# Patient Record
Sex: MALE | Race: WHITE | NOT HISPANIC OR LATINO | Employment: FULL TIME | URBAN - METROPOLITAN AREA
[De-identification: names, ages, dates, MRNs, and addresses within clinical notes are randomized per-mention and may not be internally consistent; named-entity substitution may affect disease eponyms.]

---

## 2017-09-05 DIAGNOSIS — Z47.89 ENCOUNTER FOR OTHER ORTHOPEDIC AFTERCARE (CODE): ICD-10-CM

## 2017-09-05 DIAGNOSIS — M25.569 PAIN IN KNEE: ICD-10-CM

## 2017-09-06 ENCOUNTER — TRANSCRIBE ORDERS (OUTPATIENT)
Dept: ADMINISTRATIVE | Facility: HOSPITAL | Age: 37
End: 2017-09-06

## 2017-09-06 ENCOUNTER — APPOINTMENT (OUTPATIENT)
Dept: RADIOLOGY | Facility: CLINIC | Age: 37
End: 2017-09-06
Payer: COMMERCIAL

## 2017-09-06 ENCOUNTER — ALLSCRIPTS OFFICE VISIT (OUTPATIENT)
Dept: OTHER | Facility: OTHER | Age: 37
End: 2017-09-06

## 2017-09-06 DIAGNOSIS — M25.561 RIGHT KNEE PAIN, UNSPECIFIED CHRONICITY: Primary | ICD-10-CM

## 2017-09-06 DIAGNOSIS — M25.569 PAIN IN KNEE: ICD-10-CM

## 2017-09-06 PROCEDURE — 73564 X-RAY EXAM KNEE 4 OR MORE: CPT

## 2017-09-08 ENCOUNTER — HOSPITAL ENCOUNTER (OUTPATIENT)
Dept: MRI IMAGING | Facility: HOSPITAL | Age: 37
Discharge: HOME | End: 2017-09-08
Attending: ORTHOPAEDIC SURGERY
Payer: COMMERCIAL

## 2017-09-08 ENCOUNTER — HOSPITAL ENCOUNTER (OUTPATIENT)
Dept: RADIOLOGY | Facility: HOSPITAL | Age: 37
Discharge: HOME | End: 2017-09-08
Attending: ORTHOPAEDIC SURGERY
Payer: COMMERCIAL

## 2017-09-08 DIAGNOSIS — M25.561 RIGHT KNEE PAIN, UNSPECIFIED CHRONICITY: ICD-10-CM

## 2017-09-08 PROCEDURE — 77002 NEEDLE LOCALIZATION BY XRAY: CPT

## 2017-09-08 PROCEDURE — A9585 GADOBUTROL INJECTION: HCPCS | Performed by: ORTHOPAEDIC SURGERY

## 2017-09-08 PROCEDURE — 73722 MRI JOINT OF LWR EXTR W/DYE: CPT

## 2017-09-08 PROCEDURE — 27370 HB INJECTION FOR KNEE X-RAY: CPT

## 2017-09-08 RX ADMIN — GADOBUTROL 0.2 ML: 604.72 INJECTION INTRAVENOUS at 09:26

## 2017-09-08 RX ADMIN — IOHEXOL 50 ML: 300 INJECTION, SOLUTION INTRAVENOUS at 09:18

## 2017-09-12 ENCOUNTER — GENERIC CONVERSION - ENCOUNTER (OUTPATIENT)
Dept: OTHER | Facility: OTHER | Age: 37
End: 2017-09-12

## 2017-09-12 ENCOUNTER — ALLSCRIPTS OFFICE VISIT (OUTPATIENT)
Dept: OTHER | Facility: OTHER | Age: 37
End: 2017-09-12

## 2017-09-22 ENCOUNTER — ANESTHESIA (OUTPATIENT)
Dept: PERIOP | Facility: HOSPITAL | Age: 37
End: 2017-09-22
Payer: COMMERCIAL

## 2017-09-22 ENCOUNTER — HOSPITAL ENCOUNTER (OUTPATIENT)
Facility: HOSPITAL | Age: 37
Setting detail: OUTPATIENT SURGERY
Discharge: HOME | End: 2017-09-22
Attending: ORTHOPAEDIC SURGERY | Admitting: ORTHOPAEDIC SURGERY
Payer: COMMERCIAL

## 2017-09-22 ENCOUNTER — ANESTHESIA EVENT (OUTPATIENT)
Dept: PERIOP | Facility: HOSPITAL | Age: 37
End: 2017-09-22
Payer: COMMERCIAL

## 2017-09-22 VITALS
WEIGHT: 240 LBS | TEMPERATURE: 97.7 F | BODY MASS INDEX: 34.36 KG/M2 | HEIGHT: 70 IN | HEART RATE: 66 BPM | SYSTOLIC BLOOD PRESSURE: 111 MMHG | OXYGEN SATURATION: 98 % | RESPIRATION RATE: 16 BRPM | DIASTOLIC BLOOD PRESSURE: 57 MMHG

## 2017-09-22 PROBLEM — S83.241A TEAR OF MEDIAL MENISCUS OF RIGHT KNEE: Status: ACTIVE | Noted: 2017-09-22

## 2017-09-22 PROBLEM — S83.511A COMPLETE TEAR OF ANTERIOR CRUCIATE LIGAMENT OF RIGHT KNEE: Status: ACTIVE | Noted: 2017-09-22

## 2017-09-22 PROCEDURE — C1776 JOINT DEVICE (IMPLANTABLE): HCPCS | Performed by: ORTHOPAEDIC SURGERY

## 2017-09-22 PROCEDURE — C1769 GUIDE WIRE: HCPCS | Performed by: ORTHOPAEDIC SURGERY

## 2017-09-22 PROCEDURE — C1713 ANCHOR/SCREW BN/BN,TIS/BN: HCPCS | Performed by: ORTHOPAEDIC SURGERY

## 2017-09-22 DEVICE — ENDOBUTTON CL ULTRA 15MM
Type: IMPLANTABLE DEVICE | Site: KNEE | Status: FUNCTIONAL
Brand: ENDOBUTTON

## 2017-09-22 DEVICE — IMPLANTABLE DEVICE: Type: IMPLANTABLE DEVICE | Status: FUNCTIONAL

## 2017-09-22 DEVICE — BIOSURE SYNC 9-10MM TIBIAL FIXATION
Type: IMPLANTABLE DEVICE | Site: KNEE | Status: FUNCTIONAL
Brand: BIOSURE

## 2017-09-22 DEVICE — BIOSURE PK SCREW 9 MM X 25 MM
Type: IMPLANTABLE DEVICE | Site: KNEE | Status: FUNCTIONAL
Brand: BIOSURE

## 2017-09-22 RX ORDER — ACETAMINOPHEN 325 MG/1
650 TABLET ORAL EVERY 6 HOURS PRN
Status: DISCONTINUED | OUTPATIENT
Start: 2017-09-22 | End: 2017-09-22 | Stop reason: HOSPADM

## 2017-09-22 RX ORDER — OXYCODONE HYDROCHLORIDE AND ACETAMINOPHEN 5; 325 MG/1; MG/1
TABLET ORAL
Qty: 50 TABLET | Refills: 0 | Status: SHIPPED | OUTPATIENT
Start: 2017-09-22

## 2017-09-22 RX ORDER — PROPOFOL 10 MG/ML
INJECTION, EMULSION INTRAVENOUS AS NEEDED
Status: DISCONTINUED | OUTPATIENT
Start: 2017-09-22 | End: 2017-09-22 | Stop reason: SURG

## 2017-09-22 RX ORDER — ONDANSETRON 2 MG/ML
INJECTION INTRAMUSCULAR; INTRAVENOUS AS NEEDED
Status: DISCONTINUED | OUTPATIENT
Start: 2017-09-22 | End: 2017-09-22 | Stop reason: SURG

## 2017-09-22 RX ORDER — FENTANYL CITRATE/PF 50 MCG/ML
25 SYRINGE (ML) INJECTION
Status: DISCONTINUED | OUTPATIENT
Start: 2017-09-22 | End: 2017-09-22 | Stop reason: HOSPADM

## 2017-09-22 RX ORDER — FENTANYL CITRATE 50 UG/ML
INJECTION, SOLUTION INTRAMUSCULAR; INTRAVENOUS AS NEEDED
Status: DISCONTINUED | OUTPATIENT
Start: 2017-09-22 | End: 2017-09-22 | Stop reason: SURG

## 2017-09-22 RX ORDER — OXYCODONE HYDROCHLORIDE AND ACETAMINOPHEN 5; 325 MG/1; MG/1
1 TABLET ORAL EVERY 4 HOURS PRN
Status: DISCONTINUED | OUTPATIENT
Start: 2017-09-22 | End: 2017-09-22 | Stop reason: HOSPADM

## 2017-09-22 RX ORDER — SODIUM CHLORIDE, SODIUM LACTATE, POTASSIUM CHLORIDE, CALCIUM CHLORIDE 600; 310; 30; 20 MG/100ML; MG/100ML; MG/100ML; MG/100ML
20 INJECTION, SOLUTION INTRAVENOUS CONTINUOUS
Status: DISCONTINUED | OUTPATIENT
Start: 2017-09-22 | End: 2017-09-22 | Stop reason: HOSPADM

## 2017-09-22 RX ORDER — MORPHINE SULFATE 2 MG/ML
2 INJECTION, SOLUTION INTRAMUSCULAR; INTRAVENOUS EVERY 4 HOURS PRN
Status: DISCONTINUED | OUTPATIENT
Start: 2017-09-22 | End: 2017-09-22 | Stop reason: HOSPADM

## 2017-09-22 RX ORDER — KETOROLAC TROMETHAMINE 30 MG/ML
INJECTION, SOLUTION INTRAMUSCULAR; INTRAVENOUS AS NEEDED
Status: DISCONTINUED | OUTPATIENT
Start: 2017-09-22 | End: 2017-09-22 | Stop reason: SURG

## 2017-09-22 RX ORDER — MIDAZOLAM HYDROCHLORIDE 1 MG/ML
INJECTION INTRAMUSCULAR; INTRAVENOUS AS NEEDED
Status: DISCONTINUED | OUTPATIENT
Start: 2017-09-22 | End: 2017-09-22 | Stop reason: SURG

## 2017-09-22 RX ORDER — ONDANSETRON 2 MG/ML
4 INJECTION INTRAMUSCULAR; INTRAVENOUS ONCE AS NEEDED
Status: DISCONTINUED | OUTPATIENT
Start: 2017-09-22 | End: 2017-09-22 | Stop reason: HOSPADM

## 2017-09-22 RX ORDER — CLINDAMYCIN PHOSPHATE 900 MG/50ML
900 INJECTION INTRAVENOUS ONCE
Status: COMPLETED | OUTPATIENT
Start: 2017-09-22 | End: 2017-09-22

## 2017-09-22 RX ORDER — ONDANSETRON 2 MG/ML
4 INJECTION INTRAMUSCULAR; INTRAVENOUS EVERY 6 HOURS PRN
Status: DISCONTINUED | OUTPATIENT
Start: 2017-09-22 | End: 2017-09-22 | Stop reason: HOSPADM

## 2017-09-22 RX ORDER — OXYCODONE HYDROCHLORIDE AND ACETAMINOPHEN 5; 325 MG/1; MG/1
2 TABLET ORAL EVERY 4 HOURS PRN
Status: DISCONTINUED | OUTPATIENT
Start: 2017-09-22 | End: 2017-09-22 | Stop reason: HOSPADM

## 2017-09-22 RX ORDER — MINERAL OIL
OIL (ML) MISCELLANEOUS AS NEEDED
Status: DISCONTINUED | OUTPATIENT
Start: 2017-09-22 | End: 2017-09-22 | Stop reason: HOSPADM

## 2017-09-22 RX ADMIN — MIDAZOLAM HYDROCHLORIDE 2 MG: 1 INJECTION, SOLUTION INTRAMUSCULAR; INTRAVENOUS at 10:05

## 2017-09-22 RX ADMIN — CLINDAMYCIN PHOSPHATE 900 MG: 18 INJECTION, SOLUTION INTRAMUSCULAR; INTRAVENOUS at 10:00

## 2017-09-22 RX ADMIN — MIDAZOLAM HYDROCHLORIDE 4 MG: 1 INJECTION, SOLUTION INTRAMUSCULAR; INTRAVENOUS at 09:15

## 2017-09-22 RX ADMIN — PROPOFOL 300 MG: 10 INJECTION, EMULSION INTRAVENOUS at 10:05

## 2017-09-22 RX ADMIN — SODIUM CHLORIDE, SODIUM LACTATE, POTASSIUM CHLORIDE, AND CALCIUM CHLORIDE: .6; .31; .03; .02 INJECTION, SOLUTION INTRAVENOUS at 09:08

## 2017-09-22 RX ADMIN — FENTANYL CITRATE 25 MCG: 50 INJECTION, SOLUTION INTRAMUSCULAR; INTRAVENOUS at 12:13

## 2017-09-22 RX ADMIN — ONDANSETRON 4 MG: 2 INJECTION INTRAMUSCULAR; INTRAVENOUS at 11:45

## 2017-09-22 RX ADMIN — SODIUM CHLORIDE, SODIUM LACTATE, POTASSIUM CHLORIDE, AND CALCIUM CHLORIDE 20 ML/HR: .6; .31; .03; .02 INJECTION, SOLUTION INTRAVENOUS at 11:57

## 2017-09-22 RX ADMIN — OXYCODONE HYDROCHLORIDE AND ACETAMINOPHEN 1 TABLET: 5; 325 TABLET ORAL at 13:12

## 2017-09-22 RX ADMIN — SODIUM CHLORIDE, SODIUM LACTATE, POTASSIUM CHLORIDE, AND CALCIUM CHLORIDE 20 ML/HR: .6; .31; .03; .02 INJECTION, SOLUTION INTRAVENOUS at 08:29

## 2017-09-22 RX ADMIN — KETOROLAC TROMETHAMINE 30 MG: 30 INJECTION, SOLUTION INTRAMUSCULAR at 11:43

## 2017-09-22 RX ADMIN — FENTANYL CITRATE 100 MCG: 50 INJECTION, SOLUTION INTRAMUSCULAR; INTRAVENOUS at 09:15

## 2017-10-05 ENCOUNTER — ALLSCRIPTS OFFICE VISIT (OUTPATIENT)
Dept: OTHER | Facility: OTHER | Age: 37
End: 2017-10-05

## 2017-10-05 ENCOUNTER — GENERIC CONVERSION - ENCOUNTER (OUTPATIENT)
Dept: OTHER | Facility: OTHER | Age: 37
End: 2017-10-05

## 2017-10-12 ENCOUNTER — APPOINTMENT (OUTPATIENT)
Dept: PHYSICAL THERAPY | Facility: CLINIC | Age: 37
End: 2017-10-12
Payer: COMMERCIAL

## 2017-10-12 ENCOUNTER — GENERIC CONVERSION - ENCOUNTER (OUTPATIENT)
Dept: OTHER | Facility: OTHER | Age: 37
End: 2017-10-12

## 2017-10-12 DIAGNOSIS — Z47.89 ENCOUNTER FOR OTHER ORTHOPEDIC AFTERCARE (CODE): ICD-10-CM

## 2017-10-12 PROCEDURE — G8991 OTHER PT/OT GOAL STATUS: HCPCS

## 2017-10-12 PROCEDURE — 97161 PT EVAL LOW COMPLEX 20 MIN: CPT

## 2017-10-12 PROCEDURE — G8990 OTHER PT/OT CURRENT STATUS: HCPCS

## 2017-10-12 PROCEDURE — 97110 THERAPEUTIC EXERCISES: CPT

## 2017-10-17 ENCOUNTER — APPOINTMENT (OUTPATIENT)
Dept: PHYSICAL THERAPY | Facility: CLINIC | Age: 37
End: 2017-10-17
Payer: COMMERCIAL

## 2017-10-17 PROCEDURE — 97140 MANUAL THERAPY 1/> REGIONS: CPT

## 2017-10-17 PROCEDURE — 97110 THERAPEUTIC EXERCISES: CPT

## 2017-10-17 PROCEDURE — 97010 HOT OR COLD PACKS THERAPY: CPT

## 2017-10-19 ENCOUNTER — APPOINTMENT (OUTPATIENT)
Dept: PHYSICAL THERAPY | Facility: CLINIC | Age: 37
End: 2017-10-19
Payer: COMMERCIAL

## 2017-10-19 PROCEDURE — 97010 HOT OR COLD PACKS THERAPY: CPT

## 2017-10-19 PROCEDURE — 97110 THERAPEUTIC EXERCISES: CPT

## 2017-10-24 ENCOUNTER — APPOINTMENT (OUTPATIENT)
Dept: PHYSICAL THERAPY | Facility: CLINIC | Age: 37
End: 2017-10-24
Payer: COMMERCIAL

## 2017-10-24 PROCEDURE — 97140 MANUAL THERAPY 1/> REGIONS: CPT

## 2017-10-24 PROCEDURE — 97110 THERAPEUTIC EXERCISES: CPT

## 2017-10-24 PROCEDURE — 97014 ELECTRIC STIMULATION THERAPY: CPT

## 2017-10-24 PROCEDURE — 97010 HOT OR COLD PACKS THERAPY: CPT

## 2017-10-24 PROCEDURE — 97112 NEUROMUSCULAR REEDUCATION: CPT

## 2017-10-26 ENCOUNTER — APPOINTMENT (OUTPATIENT)
Dept: PHYSICAL THERAPY | Facility: CLINIC | Age: 37
End: 2017-10-26
Payer: COMMERCIAL

## 2017-10-26 PROCEDURE — 97112 NEUROMUSCULAR REEDUCATION: CPT

## 2017-10-26 PROCEDURE — 97110 THERAPEUTIC EXERCISES: CPT

## 2017-10-26 PROCEDURE — 97140 MANUAL THERAPY 1/> REGIONS: CPT

## 2017-10-26 PROCEDURE — 97010 HOT OR COLD PACKS THERAPY: CPT

## 2017-10-31 ENCOUNTER — APPOINTMENT (OUTPATIENT)
Dept: PHYSICAL THERAPY | Facility: CLINIC | Age: 37
End: 2017-10-31
Payer: COMMERCIAL

## 2017-10-31 PROCEDURE — 97010 HOT OR COLD PACKS THERAPY: CPT

## 2017-10-31 PROCEDURE — 97110 THERAPEUTIC EXERCISES: CPT

## 2017-10-31 PROCEDURE — 97140 MANUAL THERAPY 1/> REGIONS: CPT

## 2017-11-02 ENCOUNTER — APPOINTMENT (OUTPATIENT)
Dept: PHYSICAL THERAPY | Facility: CLINIC | Age: 37
End: 2017-11-02
Payer: COMMERCIAL

## 2017-11-02 PROCEDURE — 97010 HOT OR COLD PACKS THERAPY: CPT

## 2017-11-02 PROCEDURE — 97140 MANUAL THERAPY 1/> REGIONS: CPT

## 2017-11-02 PROCEDURE — 97110 THERAPEUTIC EXERCISES: CPT

## 2017-11-07 ENCOUNTER — APPOINTMENT (OUTPATIENT)
Dept: PHYSICAL THERAPY | Facility: CLINIC | Age: 37
End: 2017-11-07
Payer: COMMERCIAL

## 2017-11-07 PROCEDURE — 97010 HOT OR COLD PACKS THERAPY: CPT

## 2017-11-07 PROCEDURE — 97140 MANUAL THERAPY 1/> REGIONS: CPT

## 2017-11-07 PROCEDURE — 97110 THERAPEUTIC EXERCISES: CPT

## 2017-11-09 ENCOUNTER — APPOINTMENT (OUTPATIENT)
Dept: PHYSICAL THERAPY | Facility: CLINIC | Age: 37
End: 2017-11-09
Payer: COMMERCIAL

## 2017-11-09 ENCOUNTER — GENERIC CONVERSION - ENCOUNTER (OUTPATIENT)
Dept: OTHER | Facility: OTHER | Age: 37
End: 2017-11-09

## 2017-11-09 PROCEDURE — 97140 MANUAL THERAPY 1/> REGIONS: CPT

## 2017-11-09 PROCEDURE — 97110 THERAPEUTIC EXERCISES: CPT

## 2017-11-09 PROCEDURE — 97010 HOT OR COLD PACKS THERAPY: CPT

## 2017-11-09 PROCEDURE — 97112 NEUROMUSCULAR REEDUCATION: CPT

## 2017-11-14 ENCOUNTER — APPOINTMENT (OUTPATIENT)
Dept: PHYSICAL THERAPY | Facility: CLINIC | Age: 37
End: 2017-11-14
Payer: COMMERCIAL

## 2017-11-14 ENCOUNTER — ALLSCRIPTS OFFICE VISIT (OUTPATIENT)
Dept: OTHER | Facility: OTHER | Age: 37
End: 2017-11-14

## 2017-11-14 PROCEDURE — 97112 NEUROMUSCULAR REEDUCATION: CPT

## 2017-11-14 PROCEDURE — 97140 MANUAL THERAPY 1/> REGIONS: CPT

## 2017-11-14 PROCEDURE — 97110 THERAPEUTIC EXERCISES: CPT

## 2017-11-14 PROCEDURE — 97010 HOT OR COLD PACKS THERAPY: CPT

## 2017-11-15 NOTE — PROGRESS NOTES
Assessment    1  Aftercare following other surgery of musculoskeletal system (V58 78) (Z47 89)    Plan  Aftercare following other surgery of musculoskeletal system    · Follow-up visit in 2 months Evaluation and Treatment  Follow-up  Status: Hold For -Scheduling  Requested for: [id number]    Discussion/Summary    Patient is status post right knee arthroscopy with ACL reconstruction and partial medial meniscectomy  He is doing well  He will continue physical therapy with ACL reconstruction protocol  Follow-up in 2 months  I provided patient work note allow him to increase his work activities  He may discontinue use of the postop hinged knee brace and wear the ACL brace  All patient's questions were answered to his satisfaction  This note is dictated using [employer] software  It may contains topographical errors, grammatical errors, improperly dictated words, background noise and other errors  Chief Complaint  Postop right knee      Post-Op  HPI: This is a 59-year-old white male who is status post right knee arthroscopy with ACL reconstruction and partial medial meniscectomy on September 22, 2017  He denies any fevers or chills  Pain is well controlled  He has been attending therapy following the protocols  Patient continue to wear the postop hinged knee brace  He has been back to work with limitations  Review of Systems   Constitutional: No fever or chills, feels well, no tiredness, no recent weight loss or weight gain  Eyes: No complaints of red eyes, no eyesight problems  ENT: no complaints of loss of hearing, no nosebleeds, no sore throat  Cardiovascular: No complaints of chest pain, no palpitations, no leg claudication or lower extremity edema  Respiratory: No complaints of shortness of breath, no wheezing, no cough  Gastrointestinal: No complaints of abdominal pain, no constipation, no nausea or vomiting, no diarrhea or bloody stools    Genitourinary: No complaints of dysuria or incontinence, no hesitancy, no nocturia  Musculoskeletal: as noted in HPI  Integumentary: No complaints of skin rash or lesion, no itching or dry skin, no skin wounds  Neurological: No complaints of headache, no confusion, no numbness or tingling, no dizziness  Psychiatric: No suicidal thoughts, no anxiety, no depression  Endocrine: No muscle weakness, no frequent urination, no excessive thirst, no feelings of weakness  ROS reviewed  Active Problems    1  Aftercare following other surgery of musculoskeletal system (V58 78) (Z47 89)   2  Anterior cruciate ligament complete tear, right, subsequent encounter (V58 89) (S83 511D)   3  Fracture Of The Tarsal Bone(S) (825 20)   4  Peripheral tear of medial meniscus of right knee, unspecified whether old or current tear, subsequent encounter (V58 89,836 0) (E94 671S)    Social History   · Never a smoker    Current Meds   1  No Reported Medications Recorded    Allergies  1  Amoxicillin CAPS   2  Penicillins    Vitals   Recorded: 36VOS2060 08:52AM   Heart Rate 84   Systolic 091   Diastolic 82   Height 5 ft 11 in   Weight 240 lb    BMI Calculated 33 47   BSA Calculated 2 28       Physical Exam   Constitutional - General appearance: Normal   Musculoskeletal - Gait and station: Normal   Cardiovascular - Examination of extremities for edema and/or varicosities: Normal   Skin - Skin and subcutaneous tissue: Normal   Neurologic - Sensation: Normal -- Lower extremity peripheral neuro exam: Normal   Psychiatric - Orientation to person, place, and time: Normal -- Mood and affect: Normal   Eyes  Conjunctiva and lids: Normal     Right Knee: Appearance: an effusion grade trace-- and-- Well healed with no evidence of infection, but-- no erythema-- and-- no swelling  Surgical incision was clean, dry, and intact  Tenderness: not over the lateral joint line,-- not over the medial joint line-- and-- not diffusely over the anterior knee  Calf soft, nontender  Flexion: painless normal AROM  Extension: painless restricted AROM 3 degrees  Motor: Moving ankle and toes  Flexion was 5/5  Extension was 5/5  Special Test: negative medial Natali test,-- negative lateral Natali test,-- negative Anterior Drawer sign,-- negative Lachman's test,-- no laxity on Valgus Stress-- and-- no laxity on Varus Stress        Signatures   Electronically signed by : Munira Lamas MD; Nov 14 2017  9:22AM EST                       (Author)

## 2017-11-16 ENCOUNTER — APPOINTMENT (OUTPATIENT)
Dept: PHYSICAL THERAPY | Facility: CLINIC | Age: 37
End: 2017-11-16
Payer: COMMERCIAL

## 2017-11-16 PROCEDURE — 97010 HOT OR COLD PACKS THERAPY: CPT

## 2017-11-16 PROCEDURE — 97110 THERAPEUTIC EXERCISES: CPT

## 2017-11-16 PROCEDURE — 97112 NEUROMUSCULAR REEDUCATION: CPT

## 2017-11-21 ENCOUNTER — APPOINTMENT (OUTPATIENT)
Dept: PHYSICAL THERAPY | Facility: CLINIC | Age: 37
End: 2017-11-21
Payer: COMMERCIAL

## 2017-11-21 PROCEDURE — 97010 HOT OR COLD PACKS THERAPY: CPT

## 2017-11-21 PROCEDURE — G8991 OTHER PT/OT GOAL STATUS: HCPCS

## 2017-11-21 PROCEDURE — 97110 THERAPEUTIC EXERCISES: CPT

## 2017-11-21 PROCEDURE — G8990 OTHER PT/OT CURRENT STATUS: HCPCS

## 2017-11-21 PROCEDURE — 97140 MANUAL THERAPY 1/> REGIONS: CPT

## 2017-11-21 PROCEDURE — 97112 NEUROMUSCULAR REEDUCATION: CPT

## 2017-11-24 ENCOUNTER — APPOINTMENT (OUTPATIENT)
Dept: PHYSICAL THERAPY | Facility: CLINIC | Age: 37
End: 2017-11-24
Payer: COMMERCIAL

## 2017-11-24 PROCEDURE — 97010 HOT OR COLD PACKS THERAPY: CPT

## 2017-11-24 PROCEDURE — 97110 THERAPEUTIC EXERCISES: CPT

## 2017-11-24 PROCEDURE — 97014 ELECTRIC STIMULATION THERAPY: CPT

## 2017-11-24 PROCEDURE — 97112 NEUROMUSCULAR REEDUCATION: CPT

## 2017-11-28 ENCOUNTER — APPOINTMENT (OUTPATIENT)
Dept: PHYSICAL THERAPY | Facility: CLINIC | Age: 37
End: 2017-11-28
Payer: COMMERCIAL

## 2017-11-28 PROCEDURE — 97112 NEUROMUSCULAR REEDUCATION: CPT

## 2017-11-28 PROCEDURE — 97010 HOT OR COLD PACKS THERAPY: CPT

## 2017-11-28 PROCEDURE — 97110 THERAPEUTIC EXERCISES: CPT

## 2017-11-28 PROCEDURE — 97014 ELECTRIC STIMULATION THERAPY: CPT

## 2017-11-30 ENCOUNTER — APPOINTMENT (OUTPATIENT)
Dept: PHYSICAL THERAPY | Facility: CLINIC | Age: 37
End: 2017-11-30
Payer: COMMERCIAL

## 2017-11-30 PROCEDURE — 97010 HOT OR COLD PACKS THERAPY: CPT

## 2017-11-30 PROCEDURE — 97112 NEUROMUSCULAR REEDUCATION: CPT

## 2017-11-30 PROCEDURE — 97110 THERAPEUTIC EXERCISES: CPT

## 2017-12-05 ENCOUNTER — APPOINTMENT (OUTPATIENT)
Dept: PHYSICAL THERAPY | Facility: CLINIC | Age: 37
End: 2017-12-05
Payer: COMMERCIAL

## 2017-12-05 PROCEDURE — 97010 HOT OR COLD PACKS THERAPY: CPT

## 2017-12-05 PROCEDURE — 97110 THERAPEUTIC EXERCISES: CPT

## 2017-12-05 PROCEDURE — 97112 NEUROMUSCULAR REEDUCATION: CPT

## 2017-12-07 ENCOUNTER — APPOINTMENT (OUTPATIENT)
Dept: PHYSICAL THERAPY | Facility: CLINIC | Age: 37
End: 2017-12-07
Payer: COMMERCIAL

## 2017-12-07 PROCEDURE — 97010 HOT OR COLD PACKS THERAPY: CPT

## 2017-12-07 PROCEDURE — 97110 THERAPEUTIC EXERCISES: CPT

## 2017-12-07 PROCEDURE — 97112 NEUROMUSCULAR REEDUCATION: CPT

## 2017-12-12 ENCOUNTER — APPOINTMENT (OUTPATIENT)
Dept: PHYSICAL THERAPY | Facility: CLINIC | Age: 37
End: 2017-12-12
Payer: COMMERCIAL

## 2017-12-12 PROCEDURE — 97140 MANUAL THERAPY 1/> REGIONS: CPT

## 2017-12-12 PROCEDURE — 97110 THERAPEUTIC EXERCISES: CPT

## 2017-12-12 PROCEDURE — 97112 NEUROMUSCULAR REEDUCATION: CPT

## 2017-12-14 ENCOUNTER — APPOINTMENT (OUTPATIENT)
Dept: PHYSICAL THERAPY | Facility: CLINIC | Age: 37
End: 2017-12-14
Payer: COMMERCIAL

## 2017-12-14 PROCEDURE — 97112 NEUROMUSCULAR REEDUCATION: CPT

## 2017-12-14 PROCEDURE — 97110 THERAPEUTIC EXERCISES: CPT

## 2017-12-14 PROCEDURE — 97010 HOT OR COLD PACKS THERAPY: CPT

## 2017-12-19 ENCOUNTER — APPOINTMENT (OUTPATIENT)
Dept: PHYSICAL THERAPY | Facility: CLINIC | Age: 37
End: 2017-12-19
Payer: COMMERCIAL

## 2017-12-19 PROCEDURE — 97110 THERAPEUTIC EXERCISES: CPT

## 2017-12-19 PROCEDURE — 97112 NEUROMUSCULAR REEDUCATION: CPT

## 2017-12-21 ENCOUNTER — APPOINTMENT (OUTPATIENT)
Dept: PHYSICAL THERAPY | Facility: CLINIC | Age: 37
End: 2017-12-21
Payer: COMMERCIAL

## 2017-12-21 PROCEDURE — 97110 THERAPEUTIC EXERCISES: CPT

## 2017-12-21 PROCEDURE — 97140 MANUAL THERAPY 1/> REGIONS: CPT

## 2017-12-22 ENCOUNTER — GENERIC CONVERSION - ENCOUNTER (OUTPATIENT)
Dept: OTHER | Facility: OTHER | Age: 37
End: 2017-12-22

## 2018-01-12 VITALS
WEIGHT: 240 LBS | DIASTOLIC BLOOD PRESSURE: 82 MMHG | SYSTOLIC BLOOD PRESSURE: 130 MMHG | HEART RATE: 68 BPM | BODY MASS INDEX: 33.6 KG/M2 | HEIGHT: 71 IN

## 2018-01-13 VITALS
HEIGHT: 71 IN | HEART RATE: 84 BPM | DIASTOLIC BLOOD PRESSURE: 82 MMHG | BODY MASS INDEX: 33.6 KG/M2 | SYSTOLIC BLOOD PRESSURE: 128 MMHG | WEIGHT: 240 LBS

## 2018-01-13 NOTE — MISCELLANEOUS
Message  Return to work or school:   Kusum Brink is under my professional care  He was seen in my office on 11/14/17   He is able to return to work on  11/14/17    He is able to work with limitations ( 2 months)  Weight Bearing Status: Full Weight-Bearing  Needs to wear ACL brace at work  No running routes  Light duty  May lift up to 40 lbs  Rest as needed          Signatures   Electronically signed by : Sonido Bone MD; Nov 14 2017  9:12AM EST                       (Author)

## 2018-01-17 ENCOUNTER — ALLSCRIPTS OFFICE VISIT (OUTPATIENT)
Dept: OTHER | Facility: OTHER | Age: 38
End: 2018-01-17

## 2018-01-22 VITALS
SYSTOLIC BLOOD PRESSURE: 129 MMHG | HEART RATE: 66 BPM | BODY MASS INDEX: 33.6 KG/M2 | HEIGHT: 71 IN | WEIGHT: 240 LBS | DIASTOLIC BLOOD PRESSURE: 89 MMHG

## 2018-01-22 VITALS
DIASTOLIC BLOOD PRESSURE: 78 MMHG | BODY MASS INDEX: 33.6 KG/M2 | HEIGHT: 71 IN | SYSTOLIC BLOOD PRESSURE: 130 MMHG | WEIGHT: 240 LBS | HEART RATE: 72 BPM

## 2018-01-23 NOTE — MISCELLANEOUS
Message  Return to work or school:   Marina Brown is under my professional care  He was seen in my office on 1/17/18     He can perform work without limitations  Needs to wear right knee brace at work  OK in driving trucks          Signatures   Electronically signed by : Joshua Jones MD; Jan 17 2018  3:21PM EST                       (Author)    Electronically signed by : Joshua Jones MD; Jan 17 2018  3:26PM EST                       (Author)

## 2018-01-24 VITALS
SYSTOLIC BLOOD PRESSURE: 133 MMHG | DIASTOLIC BLOOD PRESSURE: 83 MMHG | WEIGHT: 279 LBS | HEIGHT: 71 IN | BODY MASS INDEX: 39.06 KG/M2 | HEART RATE: 76 BPM

## 2018-04-18 VITALS
WEIGHT: 278 LBS | SYSTOLIC BLOOD PRESSURE: 135 MMHG | HEART RATE: 85 BPM | DIASTOLIC BLOOD PRESSURE: 94 MMHG | HEIGHT: 70 IN | BODY MASS INDEX: 39.8 KG/M2

## 2018-04-18 DIAGNOSIS — Z47.89 AFTERCARE FOLLOWING SURGERY OF THE MUSCULOSKELETAL SYSTEM: Primary | ICD-10-CM

## 2018-04-18 PROBLEM — S83.511A COMPLETE TEAR OF ANTERIOR CRUCIATE LIGAMENT OF RIGHT KNEE: Status: RESOLVED | Noted: 2017-09-22 | Resolved: 2018-04-18

## 2018-04-18 PROBLEM — S83.241A TEAR OF MEDIAL MENISCUS OF RIGHT KNEE: Status: RESOLVED | Noted: 2017-09-22 | Resolved: 2018-04-18

## 2018-04-18 PROCEDURE — 99213 OFFICE O/P EST LOW 20 MIN: CPT | Performed by: ORTHOPAEDIC SURGERY

## 2018-04-18 NOTE — ASSESSMENT & PLAN NOTE
Patient is doing excellent status post right knee arthroscopy with ACL reconstruction and partial medial meniscectomy  He has no restrictions at this time  He no longer needs to use the brace at work or with activities  Follow-up as needed if any problems arise

## 2018-04-18 NOTE — PROGRESS NOTES
Assessment:     1  Aftercare following surgery of the musculoskeletal system        Plan:  The patient was seen and examined by Dr Bernie Mancini and myself  Problem List Items Addressed This Visit        Other    Aftercare following surgery of the musculoskeletal system - Primary     Patient is doing excellent status post right knee arthroscopy with ACL reconstruction and partial medial meniscectomy  He has no restrictions at this time  He no longer needs to use the brace at work or with activities  Follow-up as needed if any problems arise  Subjective:     Patient ID: Jonathon Schmidt is a 40 y o  male  Chief Complaint: This is a 45-year-old white male who is status post right knee arthroscopy with ACL reconstruction and partial medial meniscectomy on September 22, 2017  He is doing very well  He continues to use the ACL brace at work  He has continued to avoid high impact exercises such as running  He denies any instability or pain in his knee  Allergy:  Allergies   Allergen Reactions    Amoxicillin Shortness Of Breath and Anaphylaxis    Penicillins Shortness Of Breath and Anaphylaxis     Medications:  all current active meds have been reviewed  Past Medical History:  History reviewed  No pertinent past medical history    Past Surgical History:  Past Surgical History:   Procedure Laterality Date    KNEE ARTHROSCOPY W/ ACL RECONSTRUCTION Right 9/22/2017    Procedure: KNEE ARTHROSCOPIC RECONSTRUCTION ANTERIOR CRUCIATE LIGAMENT (ACL) WITH ALLOGRAFT; POSSIBLE MEDIAL MENISCUS REPAIR;  Surgeon: Alondra Tirado MD;  Location: [facility] OR;  Service: Orthopedics    ROTATOR CUFF REPAIR Left     lab tear w/ hardware    TONSILLECTOMY      WISDOM TOOTH EXTRACTION       Family History:  Family History   Problem Relation Age of Onset    Multiple sclerosis Father      Social History:  History   Alcohol Use    Yes     Comment: occas     History   Drug Use No     History   Smoking Status    Never Smoker   Smokeless Tobacco    Never Used     Review of Systems   Constitutional: Negative  HENT: Negative  Eyes: Negative  Respiratory: Negative  Cardiovascular: Negative  Gastrointestinal: Negative  Endocrine: Negative  Genitourinary: Negative  Musculoskeletal: Negative  Skin: Negative  Allergic/Immunologic: Negative  Neurological: Negative  Hematological: Negative  Psychiatric/Behavioral: Negative  Objective:  BP Readings from Last 1 Encounters:   04/18/18 135/94      Wt Readings from Last 1 Encounters:   04/18/18 126 kg (278 lb)      BMI:   Estimated body mass index is 39 89 kg/m² as calculated from the following:    Height as of this encounter: 5' 10" (1 778 m)  Weight as of this encounter: 126 kg (278 lb)  BSA:   Estimated body surface area is 2 4 meters squared as calculated from the following:    Height as of this encounter: 5' 10" (1 778 m)  Weight as of this encounter: 126 kg (278 lb)  Physical Exam   Constitutional: He is oriented to person, place, and time  He appears well-developed  HENT:   Head: Normocephalic and atraumatic  Eyes: EOM are normal  Pupils are equal, round, and reactive to light  Neck: Neck supple  Musculoskeletal:        Right knee: He exhibits no effusion  Neurological: He is alert and oriented to person, place, and time  Skin: Skin is warm  Psychiatric: He has a normal mood and affect  Nursing note and vitals reviewed  Right Knee Exam     Tenderness   The patient is experiencing no tenderness  Range of Motion   The patient has normal right knee ROM  Muscle Strength     The patient has normal right knee strength      Tests   Natali:  Medial - negative Lateral - negative  Lachman:  Anterior - 1+      Varus: negative  Valgus: negative    Other   Scars: present  Sensation: normal  Pulse: present  Swelling: none  Other tests: no effusion present      Left Knee Exam   Left knee exam is normal

## 2018-04-18 NOTE — LETTER
April 18, 2018     Patient: Harpreet Pantoja IV   YOB: 1980   Date of Visit: 4/18/2018       To Whom it May Concern:    Pola Stinson is under my professional care  He was seen in my office on 4/18/2018  He no longer needs to wear the knee brace at work  No restrictions  If you have any questions or concerns, please don't hesitate to call           Sincerely,          Karson Gonzalez MD        CC: No Recipients

## 2023-03-21 ENCOUNTER — APPOINTMENT (OUTPATIENT)
Dept: RADIOLOGY | Facility: CLINIC | Age: 43
End: 2023-03-21

## 2023-03-21 VITALS
DIASTOLIC BLOOD PRESSURE: 84 MMHG | SYSTOLIC BLOOD PRESSURE: 138 MMHG | BODY MASS INDEX: 35.5 KG/M2 | WEIGHT: 248 LBS | HEIGHT: 70 IN

## 2023-03-21 DIAGNOSIS — M25.561 ACUTE PAIN OF RIGHT KNEE: ICD-10-CM

## 2023-03-21 DIAGNOSIS — M79.661 PAIN IN RIGHT SHIN: ICD-10-CM

## 2023-03-21 DIAGNOSIS — M79.661 PAIN OF RIGHT LOWER LEG: Primary | ICD-10-CM

## 2023-03-21 RX ORDER — MULTIVITAMIN
1 TABLET ORAL DAILY
COMMUNITY

## 2023-03-21 NOTE — ASSESSMENT & PLAN NOTE
Findings are consistent with the right lower leg pain, possible stress fracture, DOS: 9/22/2017  Reviewed patient's knee and lower leg x-ray with him  I discussed possible cause of his leg problem  I recommended a bone scan to rule out stress fracture of the tibia  I advised against running, and he should continue with use of a stationary bike or swimming for exercise for now  He will follow up after the imaging  All patient's questions were answered to his satisfaction  This note is created using dictation transcription  It may contain typographical errors, grammatical errors, improperly dictated words, background noise and other errors

## 2023-03-21 NOTE — PROGRESS NOTES
Assessment:     1  Pain of right lower leg    2  Acute pain of right knee        Plan:     Problem List Items Addressed This Visit        Other    Pain of right lower leg - Primary     Findings are consistent with the right lower leg pain, possible stress fracture, DOS: 9/22/2017  Reviewed patient's knee and lower leg x-ray with him  I discussed possible cause of his leg problem  I recommended a bone scan to rule out stress fracture of the tibia  I advised against running, and he should continue with use of a stationary bike or swimming for exercise for now  He will follow up after the imaging  All patient's questions were answered to his satisfaction  This note is created using dictation transcription  It may contain typographical errors, grammatical errors, improperly dictated words, background noise and other errors  Relevant Orders    XR tibia fibula 2 vw right    NM bone scan 3 phase   Other Visit Diagnoses     Acute pain of right knee        Relevant Orders    XR knee 4+ vw right injury          Subjective:     Patient ID: Obie Cash is a 43 y o  male  Chief Complaint:  Maggy Steele is a 49-year-old male presenting for evaluation of the right lower leg, last seen in 2018 for the same issue, He is almost 6 years s/p right ACL reconstruction with allograft and partial medial meniscectomy, DOS: 9/22/2017  He reports feeling fine until this past January 2023, when he believes he started experiencing shin splints from running on the treadmill at the gym at a faster pace  Before this, he had been running since he was cleared status post his arthroscopy  Pain is located medial to the proximal and middle tibia, posterior knee, and over an anterior incision from his arthroscopy  Pain is only aggravated by running on the ground and treadmill, as he notes pain relief when using an elliptical  Pain is alleviated by rest, ice, and Epsom salt baths   He denies experiencing this pain to this degree in the past, and he denies any instability in the knee  He has been taking a break from running since onset of pain in January, and reports he has a 5k coming up this Saturday 3/25/2023  Information on patient's intake form was reviewed  Allergy:  Allergies   Allergen Reactions   • Amoxicillin Shortness Of Breath and Anaphylaxis   • Penicillins Shortness Of Breath and Anaphylaxis     Medications:  all current active meds have been reviewed  Past Medical History:  History reviewed  No pertinent past medical history  Past Surgical History:  Past Surgical History:   Procedure Laterality Date   • KNEE ARTHROSCOPY W/ ACL RECONSTRUCTION Right 9/22/2017    Procedure: KNEE ARTHROSCOPIC RECONSTRUCTION ANTERIOR CRUCIATE LIGAMENT (ACL) WITH ALLOGRAFT; POSSIBLE MEDIAL MENISCUS REPAIR;  Surgeon: Khari Cervantes MD;  Location:  MAIN OR;  Service: Orthopedics   • ROTATOR CUFF REPAIR Left     lab tear w/ hardware   • TONSILLECTOMY     • WISDOM TOOTH EXTRACTION       Family History:  Family History   Problem Relation Age of Onset   • Multiple sclerosis Father      Social History:  Social History     Substance and Sexual Activity   Alcohol Use Yes    Comment: occas     Social History     Substance and Sexual Activity   Drug Use No     Social History     Tobacco Use   Smoking Status Never   Smokeless Tobacco Never     Review of Systems   Constitutional: Negative for chills and fever  HENT: Negative for ear pain and sore throat  Eyes: Negative for pain and visual disturbance  Respiratory: Negative for cough and shortness of breath  Cardiovascular: Negative for chest pain and palpitations  Gastrointestinal: Negative for abdominal pain and vomiting  Genitourinary: Negative for dysuria and hematuria  Musculoskeletal: Positive for arthralgias (Right lower leg) and myalgias  Negative for back pain  Skin: Negative for color change and rash  Neurological: Negative for seizures and syncope     All other systems reviewed and are negative  Objective:  BP Readings from Last 1 Encounters:   03/21/23 138/84      Wt Readings from Last 1 Encounters:   03/21/23 112 kg (248 lb)      BMI:   Estimated body mass index is 35 58 kg/m² as calculated from the following:    Height as of this encounter: 5' 10" (1 778 m)  Weight as of this encounter: 112 kg (248 lb)  BSA:   Estimated body surface area is 2 28 meters squared as calculated from the following:    Height as of this encounter: 5' 10" (1 778 m)  Weight as of this encounter: 112 kg (248 lb)  Physical Exam  Vitals and nursing note reviewed  Constitutional:       Appearance: Normal appearance  He is well-developed  HENT:      Head: Normocephalic and atraumatic  Right Ear: External ear normal       Left Ear: External ear normal    Eyes:      General:         Right eye: No discharge  Left eye: No discharge  Extraocular Movements: Extraocular movements intact  Conjunctiva/sclera: Conjunctivae normal    Pulmonary:      Effort: Pulmonary effort is normal  No respiratory distress  Musculoskeletal:         General: Tenderness present  No signs of injury  Cervical back: Neck supple  Right knee: No effusion  Instability Tests: Medial Natali test negative and lateral Natali test negative  Comments: As noted in HPI   Skin:     General: Skin is warm and dry  Neurological:      Mental Status: He is alert and oriented to person, place, and time  Psychiatric:         Mood and Affect: Mood normal          Behavior: Behavior normal        Right Knee Exam     Muscle Strength   The patient has normal right knee strength  Tenderness   Right knee tenderness location: over anterior incision and prox/mid tibia      Range of Motion   Extension:  0 normal   Flexion:  120 normal     Tests   Natali:  Medial - negative Lateral - negative  Varus: negative Valgus: negative  Lachman:  Anterior - trace    Posterior - negative  Drawer:  Anterior - negative    Posterior - negative  Patellar apprehension: negative    Other   Erythema: absent  Scars: present (Well healed incisions from 2017 arthroscopy)  Sensation: normal  Pulse: present  Swelling: none  Effusion: no effusion present            I have personally reviewed pertinent films in PACS and my interpretation is X-rays of the right knee reveal mild osteoarthritis and evidence of ACL reconstruction, and X-rays of the right tibia reveal no acute osseous abnormalities  No periosteal reaction       Scribe Attestation    I,:  Akil Lazo am acting as a scribe while in the presence of the attending physician :       I,:  Karla White MD personally performed the services described in this documentation    as scribed in my presence :

## 2023-04-04 ENCOUNTER — HOSPITAL ENCOUNTER (OUTPATIENT)
Dept: NUCLEAR MEDICINE | Facility: HOSPITAL | Age: 43
Discharge: HOME | End: 2023-04-04
Attending: ORTHOPAEDIC SURGERY

## 2023-04-04 DIAGNOSIS — M79.661 PAIN OF RIGHT LOWER LEG: ICD-10-CM

## 2023-04-04 NOTE — TELEPHONE ENCOUNTER
Caller: Radiology    Doctor: Jasmyn Anderson    Reason for call: Significant findings of bone scan    Call back#: [phone] option 3

## 2023-04-05 VITALS
WEIGHT: 244 LBS | BODY MASS INDEX: 34.93 KG/M2 | HEIGHT: 70 IN | SYSTOLIC BLOOD PRESSURE: 122 MMHG | DIASTOLIC BLOOD PRESSURE: 80 MMHG

## 2023-04-05 DIAGNOSIS — M84.361A STRESS FRACTURE OF RIGHT TIBIA, INITIAL ENCOUNTER: Primary | ICD-10-CM

## 2023-04-05 NOTE — ASSESSMENT & PLAN NOTE
Findings today are consistent with right tibial stress fracture sustained in January 2023  Imaging and prognosis was reviewed with the patient today  CAM walker boot was fitted and provided to patient today in the office  He was encouraged to limit activities for 2 months  Work note provided stating he may return to full duty with the use of a CAM walker boot  He may use the eliptical and stationary bike  Follow up in 2 months  All patient's questions were answered to his satisfaction  This note is created using dictation transcription  It may contain typographical errors, grammatical errors, improperly dictated words, background noise and other errors

## 2023-04-05 NOTE — LETTER
April 5, 2023     Patient: Radha Blackwood IV  YOB: 1980  Date of Visit: 4/5/2023      To Whom it May Concern:    Severo Baudilioopal is under my professional care  Maggy Denver was seen in my office on 4/5/2023  Marisa Denver may return to work with no restrictions  Please allow him to wear the CAM walker boot  Re-evaluation in 2 month  If you have any questions or concerns, please don't hesitate to call           Sincerely,          Sanya Turcios MD        CC: No Recipients

## 2023-06-20 VITALS
HEIGHT: 70 IN | BODY MASS INDEX: 36.08 KG/M2 | DIASTOLIC BLOOD PRESSURE: 82 MMHG | SYSTOLIC BLOOD PRESSURE: 140 MMHG | WEIGHT: 252 LBS

## 2023-06-20 DIAGNOSIS — M84.361D STRESS FRACTURE OF RIGHT TIBIA WITH ROUTINE HEALING, SUBSEQUENT ENCOUNTER: Primary | ICD-10-CM

## 2023-06-20 PROCEDURE — 99213 OFFICE O/P EST LOW 20 MIN: CPT | Performed by: ORTHOPAEDIC SURGERY

## 2023-06-20 NOTE — PROGRESS NOTES
Assessment:     1  Stress fracture of right tibia with routine healing, subsequent encounter        Plan:     Problem List Items Addressed This Visit        Musculoskeletal and Integument    Stress fracture of right tibia - Primary     Findings today are consistent with right tibial stress fracture sustained in January 2023  Findings and treatment options were discussed with the patient  He is doing very well  Symptoms have resolved  He may continue low impact exercises  Avoid high impact activities for the next 3-4 weeks  Slowly resume activities as tolerated  Follow up as needed if any problems arise  All patient's questions were answered to his satisfaction  This note is created using dictation transcription  It may contain typographical errors, grammatical errors, improperly dictated words, background noise and other errors  Subjective:     Patient ID: Carmen James is a 43 y o  male  Chief Complaint:  43 y o  male following up for a right tibia stress fracture  Symptoms began in January 2023  He was last seen 2 months ago and placed in a cam walker  He used the cam walker as directed until about 2 weeks ago  He stopped using it since his other leg was starting to bother him  He denies feeling any pain over his tibia with weightbearing  He has been avoiding any high-impact activities  He is using the elliptical and stationary bicycle at the gym with no issues  Allergy:  Allergies   Allergen Reactions   • Amoxicillin Shortness Of Breath and Anaphylaxis   • Penicillins Shortness Of Breath and Anaphylaxis     Medications:  all current active meds have been reviewed  Past Medical History:  History reviewed  No pertinent past medical history    Past Surgical History:  Past Surgical History:   Procedure Laterality Date   • KNEE ARTHROSCOPY W/ ACL RECONSTRUCTION Right 9/22/2017    Procedure: KNEE ARTHROSCOPIC RECONSTRUCTION ANTERIOR CRUCIATE LIGAMENT (ACL) WITH ALLOGRAFT; POSSIBLE "MEDIAL MENISCUS REPAIR;  Surgeon: Jen Driver MD;  Location: [facility];  Service: Orthopedics   • ROTATOR CUFF REPAIR Left     lab tear w/ hardware   • TONSILLECTOMY     • WISDOM TOOTH EXTRACTION       Family History:  Family History   Problem Relation Age of Onset   • Multiple sclerosis Father      Social History:  Social History     Substance and Sexual Activity   Alcohol Use Yes    Comment: occas     Social History     Substance and Sexual Activity   Drug Use No     Social History     Tobacco Use   Smoking Status Never   Smokeless Tobacco Never     Review of Systems   Constitutional: Negative for chills and fever  HENT: Negative for drooling, sneezing and voice change  Eyes: Negative for discharge and redness  Respiratory: Negative for cough and wheezing  Cardiovascular: Negative  Gastrointestinal: Negative for vomiting  Genitourinary: Negative  Musculoskeletal: Negative for arthralgias (Right lower leg)  Skin: Negative for color change  Neurological: Negative  Psychiatric/Behavioral: Negative for behavioral problems  The patient is not nervous/anxious  Objective:  BP Readings from Last 1 Encounters:   06/20/23 140/82      Wt Readings from Last 1 Encounters:   06/20/23 114 kg (252 lb)      BMI:   Estimated body mass index is 36 16 kg/m² as calculated from the following:    Height as of this encounter: 5' 10\" (1 778 m)  Weight as of this encounter: 114 kg (252 lb)  BSA:   Estimated body surface area is 2 3 meters squared as calculated from the following:    Height as of this encounter: 5' 10\" (1 778 m)  Weight as of this encounter: 114 kg (252 lb)  Physical Exam  Vitals and nursing note reviewed  Constitutional:       Appearance: Normal appearance  He is well-developed  HENT:      Head: Normocephalic and atraumatic  Right Ear: External ear normal       Left Ear: External ear normal    Eyes:      Extraocular Movements: Extraocular movements intact        " Conjunctiva/sclera: Conjunctivae normal    Pulmonary:      Effort: Pulmonary effort is normal    Musculoskeletal:      Cervical back: Neck supple  Right knee: No effusion  Skin:     General: Skin is warm  Neurological:      Mental Status: He is alert and oriented to person, place, and time  Psychiatric:         Mood and Affect: Mood normal          Behavior: Behavior normal        Right Ankle Exam     Tenderness   The patient is experiencing no tenderness  Swelling: none    Range of Motion   The patient has normal right ankle ROM  Muscle Strength   The patient has normal right ankle strength  Other   Erythema: absent  Sensation: normal  Pulse: present       Right Knee Exam     Muscle Strength   The patient has normal right knee strength  Tenderness   The patient is experiencing no tenderness  Range of Motion   Extension: 0   Flexion: 120     Tests   Varus: negative Valgus: negative  Patellar apprehension: negative    Other   Erythema: absent  Scars: present (well healed)  Sensation: normal  Pulse: present  Swelling: none  Effusion: no effusion present            No new imaging        Scribe Attestation    I,:  Charla Crenshaw PA-C am acting as a scribe while in the presence of the attending physician :       I,:  Clau Partida MD personally performed the services described in this documentation    as scribed in my presence :

## 2023-06-20 NOTE — LETTER
June 20, 2023     Patient: Kacie Wilkins IV  YOB: 1980  Date of Visit: 6/20/2023      To Whom it May Concern:    Belynda Castleman is under my professional care  Yahaira Emanuel was seen in my office on 6/20/2023  Yahaira Emanuel can work with no restrictions  He no longer has to wear the cam walker  If you have any questions or concerns, please don't hesitate to call           Sincerely,          Alexsander Dang MD        CC: No Recipients

## 2023-06-20 NOTE — ASSESSMENT & PLAN NOTE
Findings today are consistent with right tibial stress fracture sustained in January 2023  Findings and treatment options were discussed with the patient  He is doing very well  Symptoms have resolved  He may continue low impact exercises  Avoid high impact activities for the next 3-4 weeks  Slowly resume activities as tolerated  Follow up as needed if any problems arise  All patient's questions were answered to his satisfaction  This note is created using dictation transcription  It may contain typographical errors, grammatical errors, improperly dictated words, background noise and other errors

## 2024-10-31 DIAGNOSIS — Z00.6 ENCOUNTER FOR EXAMINATION FOR NORMAL COMPARISON OR CONTROL IN CLINICAL RESEARCH PROGRAM: ICD-10-CM

## 2024-11-08 ENCOUNTER — APPOINTMENT (OUTPATIENT)
Dept: LAB | Facility: HOSPITAL | Age: 44
End: 2024-11-08

## 2024-11-08 DIAGNOSIS — Z00.6 ENCOUNTER FOR EXAMINATION FOR NORMAL COMPARISON OR CONTROL IN CLINICAL RESEARCH PROGRAM: ICD-10-CM

## 2024-11-08 PROCEDURE — 36415 COLL VENOUS BLD VENIPUNCTURE: CPT

## 2024-11-19 LAB
APOB+LDLR+PCSK9 GENE MUT ANL BLD/T: NOT DETECTED
BRCA1+BRCA2 DEL+DUP + FULL MUT ANL BLD/T: NOT DETECTED
MLH1+MSH2+MSH6+PMS2 GN DEL+DUP+FUL M: NOT DETECTED

## 2025-07-18 NOTE — PROGRESS NOTES
Assessment:     1  Stress fracture of right tibia, initial encounter        Plan:     Problem List Items Addressed This Visit        Musculoskeletal and Integument    Stress fracture of right tibia - Primary     Findings today are consistent with right tibial stress fracture sustained in January 2023  Imaging and prognosis was reviewed with the patient today  CAM walker boot was fitted and provided to patient today in the office  He was encouraged to limit activities for 2 months  Work note provided stating he may return to full duty with the use of a CAM walker boot  He may use the eliptical and stationary bike  Follow up in 2 months  All patient's questions were answered to his satisfaction  This note is created using dictation transcription  It may contain typographical errors, grammatical errors, improperly dictated words, background noise and other errors  Relevant Orders    Durable Medical Equipment       Subjective:     Patient ID: Gopi Morgan is a 43 y o  male  Chief Complaint:  43 y o  male presents to the office for follow up of right lower extremity pain worsening in January 2023  He has history of right ACL reconstruction with allograft and partial medial meniscectomy, DOS: 9/22/2017  He is here today to review the results of his NM bone scan  He continues to experience pain that is worst at the end of the day and with ambulation  Allergy:  Allergies   Allergen Reactions   • Amoxicillin Shortness Of Breath and Anaphylaxis   • Penicillins Shortness Of Breath and Anaphylaxis     Medications:  all current active meds have been reviewed  Past Medical History:  History reviewed  No pertinent past medical history    Past Surgical History:  Past Surgical History:   Procedure Laterality Date   • KNEE ARTHROSCOPY W/ ACL RECONSTRUCTION Right 9/22/2017    Procedure: KNEE ARTHROSCOPIC RECONSTRUCTION ANTERIOR CRUCIATE LIGAMENT (ACL) WITH ALLOGRAFT; POSSIBLE MEDIAL MENISCUS REPAIR;  Surgeon: Patient called to give negative Gonorrhea, Chlamydia, trichomonas, Bacterio Vaginosis and yeast but no answer. Voicemail left to call back.   "Gaby Ly MD;  Location: [facility] OR;  Service: Orthopedics   • ROTATOR CUFF REPAIR Left     lab tear w/ hardware   • TONSILLECTOMY     • WISDOM TOOTH EXTRACTION       Family History:  Family History   Problem Relation Age of Onset   • Multiple sclerosis Father      Social History:  Social History     Substance and Sexual Activity   Alcohol Use Yes    Comment: occas     Social History     Substance and Sexual Activity   Drug Use No     Social History     Tobacco Use   Smoking Status Never   Smokeless Tobacco Never     Review of Systems   Constitutional: Negative for chills and fever  HENT: Negative for drooling, sneezing and voice change  Eyes: Negative for discharge and redness  Respiratory: Negative for cough and wheezing  Cardiovascular: Negative  Gastrointestinal: Negative for vomiting  Genitourinary: Negative  Musculoskeletal: Positive for arthralgias (Right lower leg)  Skin: Negative for color change  Neurological: Negative  Psychiatric/Behavioral: Negative for behavioral problems  The patient is not nervous/anxious  Objective:  BP Readings from Last 1 Encounters:   04/05/23 122/80      Wt Readings from Last 1 Encounters:   04/05/23 111 kg (244 lb)      BMI:   Estimated body mass index is 35 01 kg/m² as calculated from the following:    Height as of this encounter: 5' 10\" (1 778 m)  Weight as of this encounter: 111 kg (244 lb)  BSA:   Estimated body surface area is 2 27 meters squared as calculated from the following:    Height as of this encounter: 5' 10\" (1 778 m)  Weight as of this encounter: 111 kg (244 lb)  Physical Exam  Vitals and nursing note reviewed  Constitutional:       Appearance: Normal appearance  He is well-developed  HENT:      Head: Normocephalic and atraumatic  Right Ear: External ear normal       Left Ear: External ear normal    Eyes:      Extraocular Movements: Extraocular movements intact        Conjunctiva/sclera: Conjunctivae normal  " Pulmonary:      Effort: Pulmonary effort is normal    Musculoskeletal:      Cervical back: Neck supple  Right knee: No effusion  Skin:     General: Skin is warm  Neurological:      Mental Status: He is alert and oriented to person, place, and time  Psychiatric:         Mood and Affect: Mood normal          Behavior: Behavior normal        Right Knee Exam     Muscle Strength   The patient has normal right knee strength  Tenderness   The patient is experiencing no tenderness (proximal tibia near anterior incision)  Range of Motion   Extension: 0   Flexion: 120     Tests   Varus: negative Valgus: negative  Patellar apprehension: negative    Other   Erythema: absent  Scars: present (well healed)  Sensation: normal  Pulse: present  Swelling: none  Effusion: no effusion present            I have personally reviewed pertinent films in PACS and my interpretation is Right lower extremity bone scan show increased uptake over tibia shaft consistent with stress fracture        Scribe Attestation    I,:  Litzy Evans am acting as a scribe while in the presence of the attending physician :       I,:  Luba Hernandez MD personally performed the services described in this documentation    as scribed in my presence :

## (undated) DEVICE — SUT PDS II 1 CTX 36 IN Z371T

## (undated) DEVICE — CUFF TOURNIQUET 34 X 4 IN QUICK CONNECT DISP 1BLA

## (undated) DEVICE — SUT 2 FIBERLOOP AR-7234

## (undated) DEVICE — GLOVE SRG BIOGEL ORTHOPEDIC 7.5

## (undated) DEVICE — ACE WRAP 6 IN UNSTERILE

## (undated) DEVICE — INTENDED FOR TISSUE SEPARATION, AND OTHER PROCEDURES THAT REQUIRE A SHARP SURGICAL BLADE TO PUNCTURE OR CUT.: Brand: BARD-PARKER SAFETY BLADES SIZE 11, STERILE

## (undated) DEVICE — SUT MONOCRYL 4-0 PS-2 27 IN Y426H

## (undated) DEVICE — STANDARD SURGICAL GOWN, L: Brand: CONVERTORS

## (undated) DEVICE — 3M™ STERI-DRAPE™ U-DRAPE 1015: Brand: STERI-DRAPE™

## (undated) DEVICE — 2.4 MM FLEXIBLE PASSING PINS,                                    STERILE 5 PER PACKAGE: Brand: ENDOBUTTON

## (undated) DEVICE — INTENDED FOR TISSUE SEPARATION, AND OTHER PROCEDURES THAT REQUIRE A SHARP SURGICAL BLADE TO PUNCTURE OR CUT.: Brand: BARD-PARKER ® CARBON RIB-BACK BLADES

## (undated) DEVICE — GLOVE SRG BIOGEL 7.5

## (undated) DEVICE — NEEDLE 25G X 1 1/2

## (undated) DEVICE — SUT TIGERLOOP #2 20IN AR-7234T

## (undated) DEVICE — SUT ETHILON 3-0 PS-1 18 IN 1663H

## (undated) DEVICE — SUT PDS II 1 CT-1 27 IN Z347H

## (undated) DEVICE — OCCLUSIVE GAUZE STRIP,3% BISMUTH TRIBROMOPHENATE IN PETROLATUM BLEND: Brand: XEROFORM

## (undated) DEVICE — DRAPE SHEET THREE QUARTER

## (undated) DEVICE — SKIN MARKER DUAL TIP WITH RULER CAP, FLEXIBLE RULER AND LABELS: Brand: DEVON

## (undated) DEVICE — GUIDEWIRE 1.2 MM X 18 INCH, BOX OF                                    5, STERILE: Brand: BIOSURE

## (undated) DEVICE — CHLORAPREP HI-LITE 26ML ORANGE

## (undated) DEVICE — HEAVY DUTY TABLE COVER: Brand: CONVERTORS

## (undated) DEVICE — 3000CC GUARDIAN II: Brand: GUARDIAN

## (undated) DEVICE — SUT VICRYL 2-0 UR-6 27 IN J602H

## (undated) DEVICE — CAST PADDING 6 IN STERILE

## (undated) DEVICE — SUT VICRYL 2-0 SH 27 IN UNDYED J417H

## (undated) DEVICE — REM POLYHESIVE ADULT PATIENT RETURN ELECTRODE: Brand: VALLEYLAB

## (undated) DEVICE — STERLING, VORTEX SHAVER BUR, 4.5 MM: Brand: VORTEX, STERLING

## (undated) DEVICE — TUBING ARTHROSCOPIC WAVE  MAIN PUMP

## (undated) DEVICE — 1.2 RAP-PAC B LATEX FREE: Brand: RAP-PAC

## (undated) DEVICE — IMPERVIOUS STOCKINETTE: Brand: DEROYAL

## (undated) DEVICE — 3M™ STERI-STRIP™ REINFORCED ADHESIVE SKIN CLOSURES, R1547, 1/2 IN X 4 IN (12 MM X 100 MM), 6 STRIPS/ENVELOPE: Brand: 3M™ STERI-STRIP™

## (undated) DEVICE — GLOVE INDICATOR PI UNDERGLOVE SZ 8 BLUE

## (undated) DEVICE — TUBING SUCTION 5MM X 12 FT

## (undated) DEVICE — 1820 FOAM BLOCK NEEDLE COUNTER: Brand: DEVON

## (undated) DEVICE — INTENDED FOR TISSUE SEPARATION, AND OTHER PROCEDURES THAT REQUIRE A SHARP SURGICAL BLADE TO PUNCTURE OR CUT.: Brand: BARD-PARKER SAFETY BLADES SIZE 15, STERILE

## (undated) DEVICE — ARTHROSCOPY FLOOR MAT

## (undated) DEVICE — BLADE SHAVER EXCALIBUR 4MM 13CM COOLCUT

## (undated) DEVICE — SURGICAL GOWN, XL SMARTSLEEVE: Brand: CONVERTORS

## (undated) DEVICE — PACK UNIVERSAL ARTHRSCOPY PBDS

## (undated) DEVICE — SYRINGE 30ML LL